# Patient Record
Sex: FEMALE | Race: WHITE
[De-identification: names, ages, dates, MRNs, and addresses within clinical notes are randomized per-mention and may not be internally consistent; named-entity substitution may affect disease eponyms.]

---

## 2017-01-04 NOTE — ERD
ER Documentation


Chief Complaint


Date/Time


DATE: 1/4/17 


TIME: 19:36


Chief Complaint


Pt with B arm burning and numbness X 3 weeks





HPI


Patient complains of 3 weeks of pain in the patient complains of 3 weeks of 

pain mostly in the morning from the bilateral shoulders that radiates down to 

the fingers.  It is a tingling and numbness and hot sensation.  She does 

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  She was told that she has carpal 

tunnel syndrome and they have been given her a wrist brace but actually makes 

her pain worse.  She also cuts her right heel on a piece of glass a few days 

ago now the area is red.





ROS


All systems reviewed and are negative except as per history of present illness.





Medications


Home Meds


Active Scripts


Sulfamethoxazole-Trimethoprim* (Bactrim* DS) 800-160 Mg Tab, 1 TAB PO BID for 5 

Days, TAB


   Prov:ZOHAIB DURBIN DO         1/4/17


Naproxen* (Naprosyn*) 500 Mg Tablet, 500 MG PO BID Y for PAIN AND/OR 

INFLAMMATION, #20 TAB


   Prov:ZOHAIB DURBIN DO         1/4/17


Cyclobenzaprine Hcl* (Cyclobenzaprine Hcl*) 5 Mg Tablet, 5 MG PO Q8H Y for PAIN

, #15 TAB


   Prov:GURMEET KOEHLER PA-C         12/14/16


Prednisone* (Prednisone*) 20 Mg Tab, 40 MG PO DAILY for 4 Days, TAB


   Prov:GURMEET KOEHLER PA-C         12/14/16


Ibuprofen* (Motrin*) 600 Mg Tab, 600 MG PO Q6, #30 TAB


   Prov:GURMEET KOEHLER PA-C         12/14/16





Allergies


Allergies:  


Coded Allergies:  


     No Known Allergy (Unverified , 1/4/17)





PMhx/Soc


Medical and Surgical Hx:  pt denies Medical Hx, pt denies Surgical Hx


History of Surgery:  No


Anesthesia Reaction:  No


Hx Neurological Disorder:  No


Hx Respiratory Disorders:  No


Hx Cardiac Disorders:  No


Hx Psychiatric Problems:  No


Hx Miscellaneous Medical Probl:  No


Hx Alcohol Use:  No


Hx Substance Use:  No


Hx Tobacco Use:  No


Smoking Status:  Never smoker





Physical Exam


Vitals





Vital Signs








  Date Time  Temp Pulse Resp B/P Pulse Ox O2 Delivery O2 Flow Rate FiO2


 


1/4/17 16:12 98.3 85 16 152/75 99   








Physical Exam


Const:  []


Head:   Atraumatic 


Eyes:    Normal Conjunctiva


ENT:    Normal External Ears, Nose and Mouth.


Neck:               Full range of motion..~ No meningismus.


Resp:    Clear to auscultation bilaterally


Cardio:    Regular rate and rhythm, no murmurs


Abd:    Soft, non tender, non distended. Normal bowel sounds


Skin:    No petechiae .  Left heel has a 0.5 cm laceration that is healing with 

surrounding skin is mildly erythematous and slightly warm approximately 3" x 2" 

no fluctuance no discharge


Back:    No midline or flank tenderness


Ext:    No cyanosis, or edema.  Shoulders full range of motion negative Neer's 

negative apprehension test and neurovascularly intact.  Bilateral wrist 

positive Tinel's and positive Phalen's test.  Wrists and hands are 

neurovascularly intact.


Neur:    Awake and alert


Psych:    Normal Mood and Affect





Procedures/MDM


The left heel skin has some erythema so this may be a superficial skin 

infection and will give her Bactrim.  She has positive Tinel's and Phalen's 

bilaterally so she likely has carpal tunnel syndrome and she may have possibly 

nerve impingement from post shoulders as well.  We will give her Naprosyn as 

she is already taking ibuprofen 600 mg and she was given prednisone last time 

she was here as well.  She needs to see Workmen's Compensation from her work 

since this is likely work-related.  I told her that the options for carpal 

tunnel is to rest her hand take frequent breaks if that does not work then 

consider seeing an orthopedist for possible cortisone injections or surgery.  I 

doubt abscess or cellulitis or stroke or TIA or compression syndrome or bony 

fracture.  Vital signs are stable she stable for discharge and outpatient follow

-up.





Departure


Diagnosis:  


 Primary Impression:  


 Bilateral arm pain


 Additional Impressions:  


 Skin infection


 Carpal tunnel syndrome on both sides


Referrals:  


SHAY BABB ANDREW M MD BOGIKIAN,BELINDA OCHOA,MARVA HUDSON,CARMELO GONZALEZ,BETHANY EMERY MD,MD WASSERMAN,THEODORE BETANCOURT MD








[facility]


YOU HAVE RECEIVED A MEDICAL SCREENING EXAM AND THE RESULTS INDICATE THAT YOU DO 

NOT HAVE A CONDITION THAT REQUIRES URGENT TREATMENT IN THE EMERGENCY DEPARTMENT.





FURTHER EVALUATION AND TREATMENT OF YOUR CONDITION CAN WAIT UNTIL YOU ARE SEEN 

IN YOUR DOCTORS OFFICE WITHIN THE NEXT 1-2 DAYS. IT IS YOUR RESPONSIBILITY TO 

MAKE AN APPOINTMENT FOR FOLOW-UP CARE.





IF YOU HAVE A PRIMARY DOCTOR


--you should call your primary doctor and schedule an appointment





IF YOU DO NOT HAVE A PRIMARY DOCTOR YOU CAN CALL OUR PHYSICIAN REFERRAL HOTLINE 

AT


 [phone] 





IF YOU CAN NOT AFFORD TO SEE A PHYSICIAN YOU CAN CHOSE FROM THE FOLLOWING 

[facility] [phone] [address] [facility]





[facility] [phone] [address] [facility]





[facility] [phone] [address] [facility] [phone] [address] [facility] [phone] [address] [facility] [address] [facility]. [facility] [phone] [address] [facility]. [facility]


YOU HAVE RECEIVED A MEDICAL SCREENING EXAM AND THE RESULTS INDICATE THAT YOU DO 

NOT HAVE A CONDITION THAT REQUIRES URGENT TREATMENT IN THE EMERGENCY DEPARTMENT.





FURTHER EVALUATION AND TREATMENT OF YOUR CONDITION CAN WAIT UNTIL YOU ARE SEEN 

IN YOUR DOCTORS OFFICE WITHIN THE NEXT 1-2 DAYS. IT IS YOUR RESPONSIBILITY TO 

MAKE AN APPOINTMENT FOR FOLOW-UP CARE.





IF YOU HAVE A PRIMARY DOCTOR


--you should call your primary doctor and schedule and appointment





IF YOU DO NOT HAVE A PRIMARY DOCTOR YOU CAN CALL OUR PHYSICIAN REFERRAL HOTLINE 

AT [phone].





IF YOU CAN NOT AFFORD TO SEE A PHYSICIAN YOU CAN CHOSE FROM THE FOLLOWING 

[facility] INSTITUTIONS:





[facility]


[address] [place] [address]





[facility]


[address] [place] [address]





[facility]


[address] [place] [address]





[facility]


Urgent Care





7 a.m.- 11 p.m.


Every Day of the Week





NO APPOINTMENT OR AUTHORIZATION NEEDED


[phone]





Additional Instructions:  


follow up with worlman compensation insurance.











ZOHAIB DURBIN DO Jan 4, 2017 19:38

## 2017-07-30 NOTE — ERD
ER Documentation


Chief Complaint


Date/Time


DATE: 7/30/17 


TIME: 22:33


Chief Complaint


s/p fall x1 week c/o right leg/knee pain





HPI


41-year-old female presents here in emergency department for complaints of 

right knee pain after putting pressure on it a week ago. Patient was walking, 

landed on the right leg, put pressure on the right knee, twisted it. Patient 

started to have the pain afterwards throbbing pain, 6/10 scale, is worse upon 

movement accompanied with swelling. Patient denies any numbness or tingling. 

Patient denies any fever or chills. Patient did not take any medications to 

help with symptoms. Patient denies any deformity.





ROS


All systems reviewed and are negative except as per history of present illness.





Medications


Home Meds


Active Scripts


Sulfamethoxazole-Trimethoprim* (Bactrim* DS) 800-160 Mg Tab, 1 TAB PO BID for 5 

Days, TAB


   Prov:ZOHAIB DURBIN DO         1/4/17


Naproxen* (Naprosyn*) 500 Mg Tablet, 500 MG PO BID Y for PAIN AND/OR 

INFLAMMATION, #20 TAB


   Prov:ZOHAIB DURBIN DO         1/4/17


Cyclobenzaprine Hcl* (Cyclobenzaprine Hcl*) 5 Mg Tablet, 5 MG PO Q8H Y for PAIN

, #15 TAB


   Prov:GURMEET KOEHLER PA-C         12/14/16


Prednisone* (Prednisone*) 20 Mg Tab, 40 MG PO DAILY for 4 Days, TAB


   Prov:GURMEET KOEHLER PA-C         12/14/16


Ibuprofen* (Motrin*) 600 Mg Tab, 600 MG PO Q6, #30 TAB


   Prov:GURMEET KOEHLER PA-C         12/14/16





Allergies


Allergies:  


Coded Allergies:  


     No Known Allergy (Unverified , 1/4/17)





PMhx/Soc


Medical and Surgical Hx:  pt denies Medical Hx, pt denies Surgical Hx


History of Surgery:  No


Anesthesia Reaction:  No


Hx Neurological Disorder:  No


Hx Respiratory Disorders:  No


Hx Cardiac Disorders:  No


Hx Psychiatric Problems:  No


Hx Miscellaneous Medical Probl:  No


Hx Alcohol Use:  No


Hx Substance Use:  No


Hx Tobacco Use:  No





FmHx


Family History:  No coronary disease, No diabetes, No other





Physical Exam


Vitals





Vital Signs








  Date Time  Temp Pulse Resp B/P Pulse Ox O2 Delivery O2 Flow Rate FiO2


 


7/30/17 22:05 98.1 75 18 125/70 98   








Physical Exam


GENERAL:  The patient is well developed and appropriate for usual state of 

health, in no apparent distress.


CHEST:  Clear to auscultation bilaterally. There are no rales, wheezes or 

rhonchi. 


HEART:  Regular rate and rhythm. No murmurs, clicks, rubs or gallops. No S3 or 

S4.


ABDOMEN:  Soft, nontender and nondistended. Good bowel sounds. No rebound or 

guarding. No gross peritonitis. No gross organomegaly or masses. No Burgos sign 

or McBurney point tenderness.


BACK:  No midline or flank tenderness.


EXTREMITIES:  Noted swelling in the right knee, mild tenderness on palpation on 

lateral medial aspect of the right knee, some ecchymosis noted. Able to do full 

range of motion but with pain. Equal pulses bilaterally. Full range of motion 

of other joints of the body. Grossly neurovascularly intact.


NEURO:  Alert and oriented. Cranial nerves 2-12 intact. Motor strength in all 4 

extremities with 5/5 strength.  Sensation grossly intact. Normal speech and 

gait. 


SKIN:  There is no apparent rash or petechia. The skin is warm and dry.


HEMATOLOGIC AND LYMPHATIC:  There is no evidence of excessive bruising or 

lymphedema. No gross cervical, axillary, or inguinal lymphadenopathy.


Results 24 hrs





 Current Medications








 Medications


  (Trade)  Dose


 Ordered  Sig/Dacia


 Route


 PRN Reason  Start Time


 Stop Time Status Last Admin


Dose Admin


 


 Ibuprofen


  (Motrin)  600 mg  ONCE  ONCE


 PO


   7/30/17 22:30


 7/30/17 22:31 DC 7/30/17 22:56


 





Patient was given medication for pain here in emergency department, after 

treatment, patient verbalized feeling much better. Patient's pain is improved.





PROCEDURE:    Right knee. 


 


CLINICAL INDICATION:    Pain. 


 


TECHNIQUE:    Three views including AP, lateral and oblique views of the right 

knee were obtained.  The images reviewed  on a PACS workstation. 


 


COMPARISON:   None. 


 


FINDINGS:


There is no fracture, dislocation or bone destruction.  There is no significant 

joint space narrowing or effusion.  Bone mineralization is within normal 

limits.  There is no radiopaque foreign body or abnormal calcification. 


 


IMPRESSION:


No evidence of fracture.


_____________________________________________ 


.Facundo Fortune MD, MD           Date    Time 


Electronically viewed and signed by .Facundo Fortune MD, MD on 07/31/2017 00:37 


 


D:  07/31/2017 00:37  T:  07/31/2017 00:37


.T/





CC: NARCISA MILTON NP





After receiving patients xray report, a  _knee immobilizer was applied on the 

patients _right knee_.  After application of the splint, patient has intact 

sensation and circulation on distal area of the affected joint. Patient does 

not complain of numbness or tingling after application of the splint. Patient 

tolerated procedure well. Crutches was given to use afterwards.





Procedures/MDM


Medical Decision Making: Patient's pain is most likely consistent with a  

contusion or a sprain, possibly ligament injury, further evaluation with 

outpatient MRI was recommended with the patient. There is no suspicion for 

neurovascular compromise. Patient has intact sensation and circulation of the 

affected extremity. There is low  suspicion for septic arthritis. Patient does 

not have any fever. Radiology exams of the affected area does not show any 

fracture or dislocation.





Disposition: Home. Patient is given prescription for ibuprofen for mild to 

moderate pain Norco for severe pain. Patient was advised to elevate the 

affected area and apply ice on  affected area. Patient was advised that if 

symptoms are worse, numbness, tingling, high fever, unable to move joint, 

worsening symptoms, to return to emergency department immediately. Otherwise, 

patient is advised to follow up with the primary care doctor in 5-7 days for 

reevaluation of symptoms. Patient was advised to see primary care doctor and 

request for an MRI outpatient.





Departure


Diagnosis:  


 Primary Impression:  


 Knee pain


 Laterality:  right  Chronicity:  acute  Qualified Code:  M25.561 - Acute pain 

of right knee


Condition:  Stable


Patient Instructions:  Knee Pain, Uncertain Cause





Additional Instructions:  


 Patient is given prescription for ibuprofen for mild to moderate pain Norco 

for severe pain. Patient was advised to elevate the affected area and apply ice 

on  affected area. Patient was advised that if symptoms are worse, numbness, 

tingling, high fever, unable to move joint, worsening symptoms, to return to 

emergency department immediately. Otherwise, patient is advised to follow up 

with the primary care doctor in 5-7 days for reevaluation of symptoms. Patient 

was advised to see primary care doctor and request for an MRI outpatient.











NARCISA MILTON NP Jul 30, 2017 22:36

## 2017-07-31 NOTE — RADRPT
PROCEDURE:    Right knee. 

 

CLINICAL INDICATION:    Pain. 

 

TECHNIQUE:    Three views including AP, lateral and oblique views of the right knee were obtained.  
The images reviewed  on a PACS workstation. 

 

COMPARISON:   None. 

 

FINDINGS:

There is no fracture, dislocation or bone destruction.  There is no significant joint space narrowin
g or effusion.  Bone mineralization is within normal limits.  There is no radiopaque foreign body or
 abnormal calcification. 

 

IMPRESSION:

No evidence of fracture.

_____________________________________________ 

.Facundo Fortune MD, MD           Date    Time 

Electronically viewed and signed by .Facundo Fortune MD, MD on 07/31/2017 00:37 

 

D:  07/31/2017 00:37  T:  07/31/2017 00:37

.T/

## 2017-08-20 NOTE — RADRPT
PROCEDURE:   XR RIGHT KNEE. 

 

CLINICAL INDICATION:   Right knee recurrent injury.  Pain. 

 

TECHNIQUE:   Three views of the right knee are available for review. 

 

COMPARISON: 07/30/2017. 

 

FINDINGS:

 

There is a moderate-to-large joint effusion present and there has been an increase in size.  No frac
ture is identified.  The findings may reflect a ligamentous injury or meniscal tear.  There is mild 
arthrosis of the patellofemoral joint.  No evidence for bone destructive or erosive change.

 

IMPRESSION:

 

 

1.  Increase in size of joint effusion.

2.  No fractures seen however the effusion could reflect ligamentous or meniscal injury.

 

RPTAT: XX

_____________________________________________ 

.Laurent Kent MD, MD           Date    Time 

Electronically viewed and signed by .Laurent Kent MD, MD on 08/20/2017 18:23 

 

D:  08/20/2017 18:23  T:  08/20/2017 18:23

.T/

## 2017-08-20 NOTE — ERD
ER Documentation


Chief Complaint


Date/Time


DATE: 17 


TIME: 16:58


Chief Complaint


Complains of right knee and left leg pain x 2 weeks





HPI


41-year-old female presents emergency department for right knee pain.  Stated 

that she has injured this morning month ago while she was walking the beach, 

fell, landed on her right knee.  On that same date, she went to the emergency 

department and have her knee imaging which resulted to be negative, on crutches 

and follow-up with her primary care physician, and was told to come back in the 

emergency department if she feels worse.  Now she states that she has been 

walking without crutches and not sure if she has reinjured the same knee.  She 

also added that she has lower back pain that is nontraumatic, pain radiates to 

her left lower extremity.





Denies headache, loss of consciousness, dizziness, blurry vision, changes in 

vision, photophobia, facial pain, ear pain, throat pain, difficulty swallowing, 

neck pain, shoulder pain, chest pain, cough, hemoptysis, abdominal pain, back 

pain, loss of appetite, nausea, vomiting, hematochezia, diarrhea, constipation, 

urinary symptoms, pregnancy, the possibility of being pregnant, bladder and 

bowel incontinences, extremity weakness, numbness or tingling sensation, 

difficulty walking, recent travel, recent exposure to illness, recent 

antibiotic use in the last 3 months, fever, chills. 





Allergy: No known drug allergies.


PMH: Anxiety, carpal tunnel syndrome, asthma.


Family medical history: Denies.  


 A0.


LMP: "2 months ago."


Medications: Norco.


Surgery:  4.


Primary Social History: Denies.


Denies smoking, use of alcohol, use of illegal drugs.





ROS


All systems reviewed and are negative except as per history of present illness.





Medications


Home Meds


Active Scripts


Tramadol HCl (Tramadol HCl) 50 Mg Tablet, 50 MG PO Q6, #20 TAB


   Prov:DOUGLAS JONES         17


Albuterol Sulfate* (Proair HFA*) 8.5 Gm Hfa.aer.ad, 2 PUFF INH Q4, #1 INHALER


   Prov:DOUGLAS JONES         17


Ibuprofen* (Motrin*) 800 Mg Tab, 800 MG PO Q8 Y for PAIN AND OR ELEVATED TEMP, #

30 TAB


   Prov:DOUGLAS JONES         17


Cyclobenzaprine Hcl* (Cyclobenzaprine Hcl*) 10 Mg Tablet, 10 MG PO Q12, #20 TAB


   Prov:DOUGLAS JONES F         17


Hydrocodone/Acetaminophen (Norco 5-325 Tablet) 1 Each Tablet, 1 TAB PO Q6H Y 

for SEVERE PAIN LEVEL 7-10, #20 TAB


   Prov:NARCISA MILTON NP         17


Ibuprofen* (Motrin*) 600 Mg Tab, 600 MG PO Q6H Y for PAIN AND OR ELEVATED TEMP, 

#30 TAB


   Prov:NARCISA MILTON NP         17


Sulfamethoxazole-Trimethoprim* (Bactrim* DS) 800-160 Mg Tab, 1 TAB PO BID for 5 

Days, TAB


   Prov:GIFTY,ZOHAIB DO         17


Naproxen* (Naprosyn*) 500 Mg Tablet, 500 MG PO BID Y for PAIN AND/OR 

INFLAMMATION, #20 TAB


   Prov:ZOHAIB DURBIN DO         17


Cyclobenzaprine Hcl* (Cyclobenzaprine Hcl*) 5 Mg Tablet, 5 MG PO Q8H Y for PAIN

, #15 TAB


   Prov:GURMEET KOEHLER PA-C         16


Prednisone* (Prednisone*) 20 Mg Tab, 40 MG PO DAILY for 4 Days, TAB


   Prov:GURMEET KOEHLER PA-C         16


Ibuprofen* (Motrin*) 600 Mg Tab, 600 MG PO Q6, #30 TAB


   Prov:GURMEET KOEHLER PA-C         16





Allergies


Allergies:  


Coded Allergies:  


     No Known Allergy (Unverified , 17)





PMhx/Soc


History of Surgery:  Yes (c-sec, maikol)


Anesthesia Reaction:  No


Hx Neurological Disorder:  No


Hx Respiratory Disorders:  No


Hx Cardiac Disorders:  No


Hx Psychiatric Problems:  No


Hx Miscellaneous Medical Probl:  Yes (carpal tunnel)


Hx Alcohol Use:  No


Hx Substance Use:  No


Hx Tobacco Use:  No





Physical Exam


Vitals





Vital Signs








  Date Time  Temp Pulse Resp B/P Pulse Ox O2 Delivery O2 Flow Rate FiO2


 


17 16:39 98.5 86 20 132/64 100   








Physical Exam


CONSTITUTIONAL: Well-appearing; well-nourished; in no apparent distress.  


HEAD: Normocephalic; atraumatic.  


EYES: Conjunctiva clear, sclera non-icteric, EOM intact. PERRL 


Ears: Hearing intact. EACs clear, TMs non-bulging, non-inflamed, translucent & 

mobile, ossicles normal appearance, No obstructions, no erythema, no discharges


Nose: No obstructions. No polyps. No external lesions. Mucosa non-inflamed. No 

external lesions, septum and turbinates normal. No rhinorrhea. No discharges. 

Frontal sinus is non-tender to palpation. Maxillary sinus is non-tender to 

palpation. 


MOUTH: Moist mucous membranes, no lesion, no obstructions, no vesicles, no 

thrush, patent airway


Throat: Uvula in midline. Right tonsil is +1 with no erythema, no exudate. Left 

tonsil is +1 with no erythema, no exudate. Tolerating secretions well. Good gag 

reflex. Patent airway.


Neck: Supple, without lesions, bruits, or adenopathy. No mass. Thyroid non-

enlarged and non-tender to palpation.


CHEST: Symmetrical chest. Respirations even and not labored. No retractions 

noted.


CARDIOVASCULAR: Normal S1, S2. RRR. No murmurs, gallops.


RESPIRATORY: Normal chest excursion with respiration; breath sounds clear and 

equal bilaterally; no wheezes, rhonchi, or rales.  Breathing even and 

unlabored. Speaking in clear, full, and complete sentences w/ ease. 


ABDOMEN: Normal bowel sounds normal. Soft, round, non-distended, non-guarding, 

no tenderness, no rebound, no organomegaly, no masses, no pulsating abdominal 

mass. No hernia. No peritoneal signs.


: No CVA tenderness. 


BACK: Symmetrical shoulder. Spine is midline without deformity, tenderness. No 

evidence of trauma or deformity.


PELVIS: Stable pelvis. No evidence of trauma or deformity.  


MUSCULOSKELETAL: Normal gait and station. No misalignment, asymmetry, 

crepitation, defects, tenderness, masses, effusions, decreased range of motion, 

instability, atrophy or abnormal strength or tone in the head, neck, spine, ribs

, pelvis or extremities. No calf tenderness.  Stable and unremarkable bilateral 

hips.  Left straight leg test is positive.  Left knee is unremarkable.  Left 

ankle/foot are unremarkable with no neurovascular deficits.  Right knee has no 

obvious deformity but tenderness to palpation to anterior and posterior part 

with good and full range of motion.  Negative Lachman test.  Negative drawer 

test.  Right ankle/foot are unremarkable with no neurovascular deficits.  

Negative Homans sign bilaterally.


NEUROVASCULAR: Distal pulses are present. Pedal pulse are present, equal, and 

normal. Capillary refills are < 2 seconds.


NEUROLOGIC: Alert and oriented x4. Speaks full and clear sentences.Sensation to 

pain, touch, and proprioception normal. Grossly unremarkable. No neurologic 

deficits. 


PSYCHOLOGICAL: The patients mood and manner are appropriate. No hallucinations

, delusions. Not SI. Not HI. Has the capacity to decide for self


SKIN: Normal for age and ethnicity; warm; dry; good turgor; no apparent lesions 

or exudates. No rashes, hives, discoloration. Intact.


Results 24 hrs





 Current Medications








 Medications


  (Trade)  Dose


 Ordered  Sig/Dacia


 Route


 PRN Reason  Start Time


 Stop Time Status Last Admin


Dose Admin


 


 Acetaminophen/


 Hydrocodone Bitart


  (Norco (10/325))  1 tab  ONCE  ONCE


 PO


   17 18:00


 17 18:01 DC 17 17:55


 











Procedures/MDM


Examination: Please see physical examination.





Disease process, medical treatment was explained to the patient and family 

member. They verbalized understanding and agreed with the diagnostic tests, 

medical treatment, and follow-up care.





Radiology:


X-ray of the right knee


Impression: Increasing size of joint effusion.  No fracture seen however the 

effusion could reflect ligamentous or meniscal injury.





Treatment: Knee immobilizer.  Crutches.





Re-evaluation: Denies headache, dizziness, blurry vision, neck pain, shoulder 

pain, chest pain, back pain, abdominal pain, nausea, vomiting.  No episode of 

emesis in the emergency department. Alert and oriented 4.  Speaks full and 

clear sentences.  Respirations even and unlabored.  Lung sounds clear to 

auscultation.  Ambulatory with steady gait.  No neurovascular deficits.  No 

neurological deficits.  No neurovascular deficits prior to and after the 

application of knee immobilizer.





Consultation: None.





Differential diagnosis: Fracture versus dislocation versus displacement versus 

contusion versus sprain versus sciatica





Medical decision m[age]-year-old female presents emergency department for 

right knee pain.  Stated that she has injured this morning month ago while she 

was walking the beach, fell, landed on her right knee.  On that same date, she 

went to the emergency department and have her knee imaging which resulted to be 

negative, on crutches and follow-up with her primary care physician, and was 

told to come back in the emergency department if she feels worse.  Now she 

states that she has been walking without crutches and not sure if she has 

reinjured the same knee.  She also added that she has lower back pain that is 

nontraumatic, pain radiates to her left lower extremity.  Patient's complaint, 

patient's history about her complaint, my physical findings, diagnostic test 

results, my reevaluation after treatment are consistent my final diagnosis of 

low back strain, sciatica, sneeze sprain, knee contusion.  





Patient was also asking for an inhaler for her chronic asthma stating that she 

ran out of this inhaler.





Medications prescribed are the following: Flexeril.  Motrin.  Tramadol.  Pro-

air.





Patient and family member are made aware of the side effects and adverse 

reactions of the medications prescribed. Instructed on when to seek emergent 

and medical attention in case allergic/anaphylactic reactions or severe side 

effects and or adverse reactions to medications. Patient and family member 

verbalized understanding. 





Patient instructed


Instructed to follow-up with his PCP in 24-48 hours.  PCP to refer patient to 

orthopedic doctor in the next 24-48 hours.


Instructed to Call 911 for chest pain, shortness of breath. Advised to come 

back here in ED as soon as possible for severity of symptoms which includes but 

not limited to: any new symptoms; shortness of breath/difficulty of breathing; 

cardiovascular changes; severe gastrointestinal symptoms; signs and symptoms of 

bleeding and or infection; signs of compartment syndrome/neurovascular changes; 

neurological changes/deficits. 


Patient and family member verbalized understanding. 





Upon discharge, patient is alert and oriented x 4, speaks full and clear 

sentences, denies pain, has no neurological deficits, has no neurovascular 

deficits, difficulty of breathing. Breathing even and unlabored. Lung sounds 

are clear to auscultation. Not in distress. Appears comfortable.  Ambulatory 

with steady gait. Appears satisfied with care provided here in ED.





Departure


Diagnosis:  


 Primary Impression:  


 Low back strain


 Additional Impressions:  


 Sciatica


 Knee sprain


 Knee contusion


 Meniscal injury


Condition:  Stable





Additional Instructions:  


Instructed to follow-up with his PCP in 24-48 hours.  PCP to refer patient to 

orthopedic doctor in the next 24-48 hours.


Instructed to Call 911 for chest pain, shortness of breath. Advised to come 

back here in ED as soon as possible for severity of symptoms which includes but 

not limited to: any new symptoms; shortness of breath/difficulty of breathing; 

cardiovascular changes; severe gastrointestinal symptoms; signs and symptoms of 

bleeding and or infection; signs of compartment syndrome/neurovascular changes; 

neurological changes/deficits. 


Patient and family member verbalized understanding.











DOUGLAS JONES Aug 20, 2017 17:09

## 2017-09-07 NOTE — ERD
ER Documentation


Chief Complaint


Date/Time


DATE: 9/7/17 


TIME: 14:45


Chief Complaint


SCIATICA PAIN,RIGHT KNEE PAIN FROM A FALL INCIDENT 2 MOS AGO





HPI


41-year-old female with history of sciatica, chronic right knee pain, bilateral 

carpal tunnel syndrome presents with multiple body complaints and pains.  She 

comes here in pain because she was evicted from her home, and she states that 

she lost her medications.  Patient reports she usually takes tramadol, 

ibuprofen for pain.  She has a appointment to see a primary doctor on September 

15 which is a week from now. Patient reports that she has had pain rated severe

, and reports associated nausea vomiting that started this morning.  She denies 

abdominal pain.  She denies UTI symptoms. Patient denies saddle anesthesia loss 

of bowel bladder function.  She denies fevers or chills or trauma.





ROS


All systems reviewed and are negative except as per history of present illness.





Medications


Home Meds


Active Scripts


Tramadol HCl (Tramadol HCl) 50 Mg Tablet, 50 MG PO Q4 Y for PAIN, #15 TAB


   Prov:GURMEET KOEHLER PA-C         9/7/17


Ondansetron (Ondansetron Odt) 4 Mg Tab.rapdis, 4 MG PO Q6H Y for NAUSEA AND/OR 

VOMITING, #10 TAB


   Prov:GURMEET KOEHLER PA-C         9/7/17


Tramadol HCl (Tramadol HCl) 50 Mg Tablet, 50 MG PO Q6, #20 TAB


   Prov:DOUGLAS JONES         8/20/17


Albuterol Sulfate* (Proair HFA*) 8.5 Gm Hfa.aer.ad, 2 PUFF INH Q4, #1 INHALER


   Prov:DOUGLAS JONES         8/20/17


Ibuprofen* (Motrin*) 800 Mg Tab, 800 MG PO Q8 Y for PAIN AND OR ELEVATED TEMP, #

30 TAB


   Prov:DOUGLAS JONES         8/20/17


Cyclobenzaprine Hcl* (Cyclobenzaprine Hcl*) 10 Mg Tablet, 10 MG PO Q12, #20 TAB


   Prov:DOUGLAS JONES         8/20/17


Hydrocodone/Acetaminophen (Norco 5-325 Tablet) 1 Each Tablet, 1 TAB PO Q6H Y 

for SEVERE PAIN LEVEL 7-10, #20 TAB


   Prov:NARCISA MILTON NP         7/31/17


Ibuprofen* (Motrin*) 600 Mg Tab, 600 MG PO Q6H Y for PAIN AND OR ELEVATED TEMP, 

#30 TAB


   Prov:NARCISA MILTON NP         7/31/17


Sulfamethoxazole-Trimethoprim* (Bactrim* DS) 800-160 Mg Tab, 1 TAB PO BID for 5 

Days, TAB


   Prov:ZOHAIB DURBIN DO         1/4/17


Naproxen* (Naprosyn*) 500 Mg Tablet, 500 MG PO BID Y for PAIN AND/OR 

INFLAMMATION, #20 TAB


   Prov:ZOHAIB DURBIN DO         1/4/17


Cyclobenzaprine Hcl* (Cyclobenzaprine Hcl*) 5 Mg Tablet, 5 MG PO Q8H Y for PAIN

, #15 TAB


   Prov:GURMEET KOEHLER PA-C         12/14/16


Prednisone* (Prednisone*) 20 Mg Tab, 40 MG PO DAILY for 4 Days, TAB


   Prov:GURMEET KOEHLER PA-C         12/14/16


Ibuprofen* (Motrin*) 600 Mg Tab, 600 MG PO Q6, #30 TAB


   Prov:GURMEET KOEHLER PA-C         12/14/16





Allergies


Allergies:  


Coded Allergies:  


     No Known Allergy (Unverified , 8/20/17)





PMhx/Soc


History of Surgery:  Yes (c-sec, maikol)


Anesthesia Reaction:  No


Hx Neurological Disorder:  No


Hx Respiratory Disorders:  No


Hx Cardiac Disorders:  No


Hx Psychiatric Problems:  No


Hx Miscellaneous Medical Probl:  Yes (carpal tunnel)


Hx Alcohol Use:  No


Hx Substance Use:  No


Hx Tobacco Use:  No


Smoking Status:  Never smoker





Physical Exam


Vitals





Vital Signs








  Date Time  Temp Pulse Resp B/P Pulse Ox O2 Delivery O2 Flow Rate FiO2


 


9/7/17 15:05  72 16 121/56 98 Room Air  


 


9/7/17 11:52 97.9 82 18 188/89 100   








Physical Exam


General: Well-developed, well-nourished.  The patient appears in no acute 

distress.


HEENT: Head is normocephalic, atraumatic. No scleral icterus.  Neck: Supple.  

Nontender.


Lungs: Clear to auscultation.  Normal air movement.


Heart: Regular rate and rhythm.  S1 and S2 are normal.  No murmurs, gallops, or 

rubs.


Abdomen: Soft, nontender, nondistended.  Bowel sounds are normoactive.


Back: No midline tenderness, no step-offs.  Strength lower extremities 5 out of 

5 bilaterally.


Extremities: No clubbing or cyanosis.  Normal pulses. Moving extremities x 4. 

No weakness.


Neurologic: Alert and oriented 3.  No focal deficits.


Skin: Normal turgor.  No rash or lesions.


Results 24 hrs





 Laboratory Tests








Test


  9/7/17


13:38


 


Beta HCG, Quantitative < 2.4mIU/ml 








 Current Medications








 Medications


  (Trade)  Dose


 Ordered  Sig/Dacia


 Route


 PRN Reason  Start Time


 Stop Time Status Last Admin


Dose Admin


 


 Ketorolac


 Tromethamine


  (Toradol)  30 mg  ONCE  STAT


 IM


   9/7/17 13:21


 9/7/17 13:33 DC  


 


 


 Ondansetron HCl


  (Zofran Odt)  4 mg  ONCE  STAT


 ODT


   9/7/17 13:22


 9/7/17 13:23 DC 9/7/17 13:22


 


 


 Ketorolac


 Tromethamine


  (Toradol)  30 mg  ONCE  STAT


 IV


   9/7/17 13:32


 9/7/17 13:33 DC 9/7/17 13:32


 











Procedures/MDM


ED course:


We will try to obtain urine from her, patient states that she was not able to 

urinate she reports vomiting, IV line was established, blood was drawn and her 

beta quantitative is negative so she was given Toradol 30 mg IV.  She received 

Zofran 4 mg ODT.





Reassessment the patient shows that her pain is much improved, blood pressure 

was rechecked and has normalized.





MDM: 41-year-old female comes in with multiple complaints with body pain, back 

pain, bilateral carpal tunnel pain as well as knee pain.Patient presents with 

acute onset of pain, she was given Toradol and Zofran which alleviated her 

symptoms.  I do not see any signs of acute coronary syndrome, dissection, 

aneurysm, pulmonary embolus.  Back pain appears to be chronic.  I advised that 

we do not refill narcotic pain medication are lost or stolen emergency room.  

Given a short course of tramadol, as well as Zofran for symptoms.  Further pain 

management she is to follow-up with her primary doctor.





Departure


Diagnosis:  


 Primary Impression:  


 Sciatica


 Additional Impression:  


 Knee sprain


Condition:  Good


Patient Instructions:  Back Pain W/ Sciatica





Additional Instructions:  


Call your primary care doctor TOMORROW for an appointment during the next 1-2 

days.See the doctor sooner or return here if your condition worsens before your 

appointment time.











GURMEET KOEHLER PA-C Sep 7, 2017 14:48

## 2017-10-16 ENCOUNTER — HOSPITAL ENCOUNTER (EMERGENCY)
Dept: HOSPITAL 10 - FTE | Age: 42
Discharge: HOME | End: 2017-10-16
Payer: COMMERCIAL

## 2017-10-16 VITALS
HEIGHT: 62 IN | BODY MASS INDEX: 52.74 KG/M2 | WEIGHT: 286.6 LBS | WEIGHT: 286.6 LBS | HEIGHT: 62 IN | BODY MASS INDEX: 52.74 KG/M2

## 2017-10-16 DIAGNOSIS — S61.216A: Primary | ICD-10-CM
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
## 2017-10-16 PROCEDURE — 12001 RPR S/N/AX/GEN/TRNK 2.5CM/<: CPT

## 2017-10-16 PROCEDURE — 99282 EMERGENCY DEPT VISIT SF MDM: CPT

## 2017-10-16 NOTE — ERD
ER Documentation


Chief Complaint


Date/Time


DATE: 10/16/17 


TIME: 18:46


Chief Complaint


Laceration





HPI


41-year-old female presents emergency department status post slip and fall in 

the shower, complaining of a laceration to her right fifth digit.  She states 

while she was fine there is a piece of metal sticking out and it caused a 

laceration to the lateral aspect of her pinky finger.  No difficulty with 

movement of the finger, she denies paresthesias.





ROS


All systems reviewed and are negative except as per history of present illness.





Medications


Home Meds


Active Scripts


Tramadol HCl (Tramadol HCl) 50 Mg Tablet, 50 MG PO Q4 Y for PAIN, #15 TAB


   Prov:GURMEET KOEHLER PA-C         9/7/17


Ondansetron (Ondansetron Odt) 4 Mg Tab.rapdis, 4 MG PO Q6H Y for NAUSEA AND/OR 

VOMITING, #10 TAB


   Prov:GURMEET KOEHLER PA-C         9/7/17


Tramadol HCl (Tramadol HCl) 50 Mg Tablet, 50 MG PO Q6, #20 TAB


   Prov:DOUGLAS JONES         8/20/17


Albuterol Sulfate* (Proair HFA*) 8.5 Gm Hfa.aer.ad, 2 PUFF INH Q4, #1 INHALER


   Prov:DOUGLAS JONES         8/20/17


Ibuprofen* (Motrin*) 800 Mg Tab, 800 MG PO Q8 Y for PAIN AND OR ELEVATED TEMP, #

30 TAB


   Prov:DOUGLAS JONES         8/20/17


Cyclobenzaprine Hcl* (Cyclobenzaprine Hcl*) 10 Mg Tablet, 10 MG PO Q12, #20 TAB


   Prov:DOUGLAS JONES         8/20/17


Hydrocodone/Acetaminophen (Norco 5-325 Tablet) 1 Each Tablet, 1 TAB PO Q6H Y 

for SEVERE PAIN LEVEL 7-10, #20 TAB


   Prov:NARCISA MILTON NP         7/31/17


Ibuprofen* (Motrin*) 600 Mg Tab, 600 MG PO Q6H Y for PAIN AND OR ELEVATED TEMP, 

#30 TAB


   Prov:NARCISA MILTON NP         7/31/17


Sulfamethoxazole-Trimethoprim* (Bactrim* DS) 800-160 Mg Tab, 1 TAB PO BID for 5 

Days, TAB


   Prov:ZOHAIB DURBIN DO         1/4/17


Naproxen* (Naprosyn*) 500 Mg Tablet, 500 MG PO BID Y for PAIN AND/OR 

INFLAMMATION, #20 TAB


   Prov:ZOHAIB DURBIN DO         1/4/17


Cyclobenzaprine Hcl* (Cyclobenzaprine Hcl*) 5 Mg Tablet, 5 MG PO Q8H Y for PAIN

, #15 TAB


   Prov:GURMEET KOEHLER PA-C         12/14/16


Prednisone* (Prednisone*) 20 Mg Tab, 40 MG PO DAILY for 4 Days, TAB


   Prov:GURMEET KOEHLER PA-C         12/14/16


Ibuprofen* (Motrin*) 600 Mg Tab, 600 MG PO Q6, #30 TAB


   Prov:GURMEET KOEHLER PA-C         12/14/16





Allergies


Allergies:  


Coded Allergies:  


     No Known Allergy (Unverified , 10/16/17)





PMhx/Soc


History of Surgery:  Yes (c-sec, maikol)


Anesthesia Reaction:  No


Hx Neurological Disorder:  No


Hx Respiratory Disorders:  No


Hx Cardiac Disorders:  No


Hx Psychiatric Problems:  No


Hx Miscellaneous Medical Probl:  Yes (carpal tunnel)


Hx Alcohol Use:  No


Hx Substance Use:  No


Hx Tobacco Use:  No


Smoking Status:  Never smoker





Physical Exam


Vitals





Vital Signs








  Date Time  Temp Pulse Resp B/P Pulse Ox O2 Delivery O2 Flow Rate FiO2


 


10/16/17 15:39 98.1 99 18 129/63 99   








Physical Exam


= General: Well-developed, well-nourished.  The patient appears in no acute 

distress.


HEENT: Head is normocephalic, atraumatic.  No scleral icterus.


Neck: Supple.  Nontender.


Lungs: Clear to auscultation.  Normal air movement.


Heart: Regular rate and rhythm.  S1 and S2 are normal.  No murmurs, gallops, or 

rubs.


Abdomen: Nondistended.


Extremities: No clubbing or cyanosis. Moving extremities x 4. No weakness.


Neurologic: Alert and oriented 3.  No focal deficits. Normal speech and gait.


Skin: 1 cm V-shaped laceration at the lateral aspect of the right fifth digit.  

There is no active bleeding, it is superficial, and is avulsion of the skin.  

For torsion at DIP, PIP and MCP joint.


Results 24 hrs





 Current Medications








 Medications


  (Trade)  Dose


 Ordered  Sig/Dacia


 Route


 PRN Reason  Start Time


 Stop Time Status Last Admin


Dose Admin


 


 Lidocaine


  (Xylocaine 1%


  (Mdv) 20 ml)  20 ml  ONCE  ONCE


 SC


   10/16/17 18:00


 10/16/17 18:01 DC  


 











Procedures/MDM


Laceration Repair by me: She was verbally consented


Anesthesia:                             1% lidocaine locally


Location:                                  Right fifth digit


Tendon/Joint/Nerves:             No injury


Foreign body:                           None detected after copious irrigation 

and exploration


Technique:                              Simple Interrupted Sutures 5 using 5-0 

Ethilon


Complexity:                             No subcutaneous sutures/mucosal repair/

edge excision


Post Closure Length:             2cm





Patient's bleeding was easily controlled in the department and there is no 

indication of anemia.


No evidence of compartment syndrome, neurologic injury, vascular injury, open 

joint, tendon laceration, or foreign body.


Patient is appropriate for outpatient follow up.





48 hour wound check.  Scar minimization instructions given.





Departure


Diagnosis:  


 Primary Impression:  


 Laceration


Condition:  Good


Patient Instructions:  Laceration, All





Additional Instructions:  


Follow up in 2 days in your clinic for wound check.





Follow up with your physician to remove the stitches: 7-10 days.











GURMEET KOEHLER PA-C Oct 16, 2017 18:48

## 2017-10-22 ENCOUNTER — HOSPITAL ENCOUNTER (EMERGENCY)
Dept: HOSPITAL 10 - FTE | Age: 42
Discharge: LEFT BEFORE DISCHARGE | End: 2017-10-22
Payer: COMMERCIAL

## 2017-10-22 VITALS
BODY MASS INDEX: 45.99 KG/M2 | BODY MASS INDEX: 45.99 KG/M2 | HEIGHT: 67 IN | WEIGHT: 293 LBS | WEIGHT: 293 LBS | HEIGHT: 67 IN

## 2017-10-22 DIAGNOSIS — Z53.21: Primary | ICD-10-CM

## 2017-10-23 ENCOUNTER — HOSPITAL ENCOUNTER (EMERGENCY)
Dept: HOSPITAL 10 - FTE | Age: 42
Discharge: LEFT BEFORE DISCHARGE | End: 2017-10-23
Payer: COMMERCIAL

## 2017-10-23 VITALS
BODY MASS INDEX: 45.67 KG/M2 | HEIGHT: 67 IN | WEIGHT: 291.01 LBS | BODY MASS INDEX: 45.67 KG/M2 | WEIGHT: 291.01 LBS | HEIGHT: 67 IN

## 2017-10-23 DIAGNOSIS — Z48.02: Primary | ICD-10-CM

## 2017-10-23 PROCEDURE — 99281 EMR DPT VST MAYX REQ PHY/QHP: CPT

## 2017-10-23 NOTE — ERD
ER Documentation


Chief Complaint


Chief Complaint


suture removal right 5th finger





HPI


41-year-old female presented ED for suture removal of her right fifth finger.  

She has sustained laceration 7 days ago, and had closure done here.  He reports 

no problems, able to flex and extend her affected finger.  Denies fever or 

chills.  Denies increased pain, swelling, or exudate.





ROS


All systems reviewed and are negative except as per history of present illness.





Medications


Home Meds


Active Scripts


Tramadol HCl (Tramadol HCl) 50 Mg Tablet, 50 MG PO Q4 Y for PAIN, #15 TAB


   Prov:GURMEET KOEHLER PA-C         9/7/17


Ondansetron (Ondansetron Odt) 4 Mg Tab.rapdis, 4 MG PO Q6H Y for NAUSEA AND/OR 

VOMITING, #10 TAB


   Prov:GURMEET KOEHLER PA-C         9/7/17


Tramadol HCl (Tramadol HCl) 50 Mg Tablet, 50 MG PO Q6, #20 TAB


   Prov:DOUGLAS JONES         8/20/17


Albuterol Sulfate* (Proair HFA*) 8.5 Gm Hfa.aer.ad, 2 PUFF INH Q4, #1 INHALER


   Prov:DOUGLAS JONES         8/20/17


Ibuprofen* (Motrin*) 800 Mg Tab, 800 MG PO Q8 Y for PAIN AND OR ELEVATED TEMP, #

30 TAB


   Prov:DOUGLAS JONES         8/20/17


Cyclobenzaprine Hcl* (Cyclobenzaprine Hcl*) 10 Mg Tablet, 10 MG PO Q12, #20 TAB


   Prov:DOUGLAS JONES         8/20/17


Hydrocodone/Acetaminophen (Norco 5-325 Tablet) 1 Each Tablet, 1 TAB PO Q6H Y 

for SEVERE PAIN LEVEL 7-10, #20 TAB


   Prov:NARCISA MILTON NP         7/31/17


Ibuprofen* (Motrin*) 600 Mg Tab, 600 MG PO Q6H Y for PAIN AND OR ELEVATED TEMP, 

#30 TAB


   Prov:NARCISA MILTON NP         7/31/17


Sulfamethoxazole-Trimethoprim* (Bactrim* DS) 800-160 Mg Tab, 1 TAB PO BID for 5 

Days, TAB


   Prov:ZOHAIB DURBIN DO         1/4/17


Naproxen* (Naprosyn*) 500 Mg Tablet, 500 MG PO BID Y for PAIN AND/OR 

INFLAMMATION, #20 TAB


   Prov:ZOHAIB DURBIN DO         1/4/17


Cyclobenzaprine Hcl* (Cyclobenzaprine Hcl*) 5 Mg Tablet, 5 MG PO Q8H Y for PAIN

, #15 TAB


   Prov:GURMEET KOEHLER PA-C         12/14/16


Prednisone* (Prednisone*) 20 Mg Tab, 40 MG PO DAILY for 4 Days, TAB


   Prov:GURMEET KOEHLER PA-C         12/14/16


Ibuprofen* (Motrin*) 600 Mg Tab, 600 MG PO Q6, #30 TAB


   Prov:GURMEET KOEHLER PA-C         12/14/16





Allergies


Allergies:  


Coded Allergies:  


     No Known Allergy (Unverified , 10/22/17)





PMhx/Soc


History of Surgery:  Yes (c-sec, maikol)


Anesthesia Reaction:  No


Hx Neurological Disorder:  No


Hx Respiratory Disorders:  No


Hx Cardiac Disorders:  No


Hx Psychiatric Problems:  No


Hx Miscellaneous Medical Probl:  Yes (carpal tunnel)


Hx Alcohol Use:  No


Hx Substance Use:  No


Hx Tobacco Use:  No





Physical Exam


Vitals





Vital Signs








  Date Time  Temp Pulse Resp B/P Pulse Ox O2 Delivery O2 Flow Rate FiO2


 


10/23/17 09:59 98.1 90 18 133/65 100   








Physical Exam


General:    Well-developed, well-nourished, conscious and coherent, in no 

distress


Skin:    Warm and dry without rash, good texture and turgor


Head:    Normocephalic without evidence of trauma


Eyes:    Sclera and conjunctivae normal; pupils equal, round, and reactive to 

light; extraocular movements are intact


Chest:    Normal AP diameter.  Good expansion without retractions.  Nontender.  

Lungs are clear to auscultate bilaterally with good tidal volume


Heart:    Regular rate and rhythm.  No murmur, rub, or gallops heard


Abdomen:    Soft and nontender without masses, guarding, or rebound.  Bowel 

sounds are active.  No hepatosplenomegaly


Extremities:    Full range of motion.  Good strength bilaterally.  No clubbing, 

cyanosis, or edema. Peripheral pulses are intact. Sensation intact.  No 

periorbital erythema, swelling, or exudate at the suture site


Neuro:    Alert and oriented 4, GCS 15.  Cranial nerves grossly intact.  Motor 

and sensory exams nonfocal.  Moves all extremities.  Speech clear.  Gait normal





Procedures/MDM


Suture Removal by me:


Sutures removed with tweezers and scissors without incident.





Wound shows no evidence of infection, foreign body, neurologic injury, vascular 

injury, open joint or tendon laceration.  No wound dehiscence.





Patient to follow up PRN.





Departure


Diagnosis:  


 Primary Impression:  


 Encounter for removal of sutures


Condition:  Stable


Patient Instructions:  Suture Removal, No Complication


Referrals:  


[facility] (PCP)





Additional Instructions:  


Call your primary care doctor TOMORROW for an appointment during the next 1 

WEEK.Tell the  that you were referred from this facility.See the 

doctor sooner or return here if your  condition worsens before your appointment 

time.











SUZANNE RAYA NP Oct 23, 2017 11:06

## 2017-12-14 ENCOUNTER — HOSPITAL ENCOUNTER (EMERGENCY)
Dept: HOSPITAL 10 - FTE | Age: 42
Discharge: HOME | End: 2017-12-14
Payer: COMMERCIAL

## 2017-12-14 VITALS
WEIGHT: 293 LBS | BODY MASS INDEX: 53.92 KG/M2 | HEIGHT: 62 IN | WEIGHT: 293 LBS | BODY MASS INDEX: 53.92 KG/M2 | HEIGHT: 62 IN

## 2017-12-14 DIAGNOSIS — G57.02: Primary | ICD-10-CM

## 2017-12-14 DIAGNOSIS — E66.01: ICD-10-CM

## 2017-12-14 PROCEDURE — 99283 EMERGENCY DEPT VISIT LOW MDM: CPT

## 2017-12-14 NOTE — ERD
ER Documentation


Chief Complaint


Chief Complaint


" sciatic nerve pain" x 2 days





HPI


42-year-old female with history of sciatica pain on her right presents the ED 

today complaining of "sciatic pain" on her left.  Patient stated that she had 

pain in her left buttock that radiates down to her left leg for the last 2 

days.  The pain is worse with movement, she cannot find a comfortable position.

  She is also complaining of the toes in her left foot feeling numb.  Denies 

falls or injuries.  Denies saddle paresthesia.  Denies bowel or bladder 

dysfunction.





ROS


All systems reviewed and are negative except as per history of present illness.





Medications


Home Meds


Active Scripts


Cyclobenzaprine Hcl* (Cyclobenzaprine Hcl*) 10 Mg Tablet, 10 MG PO TID, #15 TAB


   Prov:SUZANNE RAYA NP         12/14/17


Ibuprofen* (Motrin*) 800 Mg Tab, 800 MG PO Q6H Y for PAIN AND OR ELEVATED TEMP, 

#30 TAB


   Prov:SUZANNE RAYA NP         12/14/17


Tramadol HCl (Tramadol HCl) 50 Mg Tablet, 50 MG PO Q4 Y for PAIN, #15 TAB


   Prov:GURMEET KOEHLER PA-C         9/7/17


Ondansetron (Ondansetron Odt) 4 Mg Tab.rapdis, 4 MG PO Q6H Y for NAUSEA AND/OR 

VOMITING, #10 TAB


   Prov:GURMEET KOEHLER PA-C         9/7/17


Tramadol HCl (Tramadol HCl) 50 Mg Tablet, 50 MG PO Q6, #20 TAB


   Prov:DOUGLAS JONES         8/20/17


Albuterol Sulfate* (Proair HFA*) 8.5 Gm Hfa.aer.ad, 2 PUFF INH Q4, #1 INHALER


   Prov:DOUGLAS OJNES         8/20/17


Ibuprofen* (Motrin*) 800 Mg Tab, 800 MG PO Q8 Y for PAIN AND OR ELEVATED TEMP, #

30 TAB


   Prov:DOUGLAS JONES         8/20/17


Cyclobenzaprine Hcl* (Cyclobenzaprine Hcl*) 10 Mg Tablet, 10 MG PO Q12, #20 TAB


   Prov:DOUGLAS JONES         8/20/17


Hydrocodone/Acetaminophen (Norco 5-325 Tablet) 1 Each Tablet, 1 TAB PO Q6H Y 

for SEVERE PAIN LEVEL 7-10, #20 TAB


   Prov:YOSELINCASSINARCISA MCMILLAN NP         7/31/17


Ibuprofen* (Motrin*) 600 Mg Tab, 600 MG PO Q6H Y for PAIN AND OR ELEVATED TEMP, 

#30 TAB


   Prov:YOSELINCASSINARCISA MCMILLAN. NP         7/31/17


Sulfamethoxazole-Trimethoprim* (Bactrim* DS) 800-160 Mg Tab, 1 TAB PO BID for 5 

Days, TAB


   Prov:GIFTY,ZOHAIB DO         1/4/17


Naproxen* (Naprosyn*) 500 Mg Tablet, 500 MG PO BID Y for PAIN AND/OR 

INFLAMMATION, #20 TAB


   Prov:GIFTYDAJAZOHAIB DO         1/4/17


Cyclobenzaprine Hcl* (Cyclobenzaprine Hcl*) 5 Mg Tablet, 5 MG PO Q8H Y for PAIN

, #15 TAB


   Prov:GURMEET KOEHLER PA-C         12/14/16


Prednisone* (Prednisone*) 20 Mg Tab, 40 MG PO DAILY for 4 Days, TAB


   Prov:GURMEET KOEHLER PA-C         12/14/16


Ibuprofen* (Motrin*) 600 Mg Tab, 600 MG PO Q6, #30 TAB


   Prov:GURMEET KOEHLER PA-C         12/14/16





Allergies


Allergies:  


Coded Allergies:  


     No Known Allergy (Unverified , 10/22/17)





PMhx/Soc


History of Surgery:  Yes (c-sec, maikol)


Anesthesia Reaction:  No


Hx Neurological Disorder:  No


Hx Respiratory Disorders:  No


Hx Cardiac Disorders:  No


Hx Psychiatric Problems:  No


Hx Miscellaneous Medical Probl:  Yes (carpal tunnel)


Hx Alcohol Use:  No


Hx Substance Use:  No


Hx Tobacco Use:  No


Smoking Status:  Never smoker





Physical Exam


Vitals





Vital Signs








  Date Time  Temp Pulse Resp B/P Pulse Ox O2 Delivery O2 Flow Rate FiO2


 


12/14/17 08:17 97.0 84 18 146/91 98   








Physical Exam


General:    Well-developed, morbidly obese, conscious and coherent, in no 

distress


Skin:    Warm and dry without rash, good texture and turgor


Head:    Normocephalic without evidence of trauma


Eyes:    Sclera and conjunctivae normal; pupils equal, round, and reactive to 

light; extraocular movements are intact


Chest:    Normal AP diameter.  Good expansion without retractions.  Nontender.  

Lungs are clear to auscultate bilaterally with good tidal volume


Heart:    Regular rate and rhythm.  No murmur, rub, or gallops heard


Back:    Without spinal or CVA tenderness.  Left gluteal tenderness.


Extremities:    Full range of motion.  Good strength bilaterally.  No clubbing, 

cyanosis, or edema. Peripheral pulses are intact. Sensation intact


Neuro:    Alert and oriented 4, GCS 15.  Cranial nerves grossly intact.  Motor 

exams nonfocal.  Diminished sensation of her left big toe, neuro exam otherwise 

normal.  Moves all extremities.  Speech clear.  Gait normal


Results 24 hrs





 Current Medications








 Medications


  (Trade)  Dose


 Ordered  Sig/Dacia


 Route


 PRN Reason  Start Time


 Stop Time Status Last Admin


Dose Admin


 


 Bupivacaine HCl


  (Marcaine 0.25%


  (Mpf) 10 ml)  10 ml  ONCE  ONCE


 INJ


   12/14/17 09:00


 12/14/17 09:01 DC  


 


 


 Bupivacaine HCl


  (Marcaine 0.25%


  (Mpf) 30 ml)  VOLUME PER


 MD  ONCE


 INJ


   12/14/17 09:30


 12/14/17 09:31 DC  


 











Procedures/MDM


Morbidly obese 42-year-old female presented ED with sciatic nerve pain 

originates from her left gluteal region.  Patient does not have any midline 

spinal tenderness.  I doubt spinal fracture, subluxation, or disc herniation.  

I doubt spinal epidural abscess, cauda equina syndrome.





Procedure note: Trigger point injection


Trigger point injection performed by me. 10 mL of bupivacaine is injected into 

left gluteal region. Total number muscle groups injected: 1. Patient reports 

improvement of pain after the trigger point injection.





Patient appears well, stable for discharge and outpatient management.  Patient 

is advised to follow-up with her PCP and request for physical therapy referral.

  Medical decision making shared with patient and family. Education provided to 

patient and family. Patient and family expressed understanding of the plan.





Medications on discharge: Ibuprofen, Flexeril.


Follow-up: Primary care provider in 2-3 days or return to ED if worse.





Disclaimer: Inadvertent spelling and grammatical errors are likely due to EHR/

dictation software use and do not reflect on the overall quality of patient 

care. Also, please note that the electronic time recorded on this note does not 

necessarily reflect the actual time of the patient encounter.





Departure


Diagnosis:  


 Primary Impression:  


 Pyriformis syndrome


 Laterality:  left  Qualified Code:  G57.02 - Piriformis syndrome of left side


 Additional Impression:  


 Morbid obesity


Condition:  Stable


Patient Instructions:  Back Exercises: Hip Rotator Stretch, Back Exercises: 

Lower Back Stretch, Back Spasm, No Trauma





Additional Instructions:  


Call your primary care doctor TOMORROW for an appointment during the next 2-3 

days.See the doctor sooner or return here if your condition worsens before your 

appointment time.





Ask your primary provider for a referral to physical therapy.











SUZANNE RAYA NP Dec 14, 2017 08:48

## 2018-02-23 ENCOUNTER — HOSPITAL ENCOUNTER (EMERGENCY)
Dept: HOSPITAL 91 - FTE | Age: 43
Discharge: HOME | End: 2018-02-23
Payer: COMMERCIAL

## 2018-02-23 ENCOUNTER — HOSPITAL ENCOUNTER (EMERGENCY)
Age: 43
Discharge: HOME | End: 2018-02-23

## 2018-02-23 DIAGNOSIS — M25.561: Primary | ICD-10-CM

## 2018-02-23 PROCEDURE — 96372 THER/PROPH/DIAG INJ SC/IM: CPT

## 2018-02-23 PROCEDURE — 99284 EMERGENCY DEPT VISIT MOD MDM: CPT

## 2018-02-23 RX ADMIN — KETOROLAC TROMETHAMINE 1 MG: 30 INJECTION, SOLUTION INTRAMUSCULAR at 12:13

## 2018-03-12 ENCOUNTER — HOSPITAL ENCOUNTER (EMERGENCY)
Age: 43
Discharge: HOME | End: 2018-03-12

## 2023-01-15 ENCOUNTER — HOSPITAL ENCOUNTER (EMERGENCY)
Dept: HOSPITAL 54 - ER | Age: 48
Discharge: LEFT BEFORE DISCHARGE | End: 2023-01-15
Payer: MEDICARE

## 2023-01-15 VITALS — HEIGHT: 68 IN | WEIGHT: 293 LBS | BODY MASS INDEX: 44.41 KG/M2

## 2023-01-15 VITALS — SYSTOLIC BLOOD PRESSURE: 132 MMHG | DIASTOLIC BLOOD PRESSURE: 77 MMHG

## 2023-01-15 DIAGNOSIS — R07.89: Primary | ICD-10-CM

## 2023-01-15 LAB
BASOPHILS # BLD AUTO: 0 K/UL (ref 0–0.2)
BASOPHILS NFR BLD AUTO: 0.4 % (ref 0–2)
BUN SERPL-MCNC: 12 MG/DL (ref 7–18)
CALCIUM SERPL-MCNC: 9.1 MG/DL (ref 8.5–10.1)
CHLORIDE SERPL-SCNC: 102 MMOL/L (ref 98–107)
CO2 SERPL-SCNC: 24 MMOL/L (ref 21–32)
CREAT SERPL-MCNC: 0.8 MG/DL (ref 0.6–1.3)
EOSINOPHIL NFR BLD AUTO: 2.9 % (ref 0–6)
EOSINOPHIL NFR BLD MANUAL: 1 % (ref 0–4)
GLUCOSE SERPL-MCNC: 96 MG/DL (ref 74–106)
HCT VFR BLD AUTO: 36 % (ref 33–45)
HGB BLD-MCNC: 11 G/DL (ref 11.5–14.8)
LYMPHOCYTES NFR BLD AUTO: 2.1 K/UL (ref 0.8–4.8)
LYMPHOCYTES NFR BLD AUTO: 33.3 % (ref 20–44)
LYMPHOCYTES NFR BLD MANUAL: 29 % (ref 16–48)
MCHC RBC AUTO-ENTMCNC: 31 G/DL (ref 31–36)
MCV RBC AUTO: 68 FL (ref 82–100)
MONOCYTES NFR BLD AUTO: 0.4 K/UL (ref 0.1–1.3)
MONOCYTES NFR BLD AUTO: 6 % (ref 2–12)
MONOCYTES NFR BLD MANUAL: 7 % (ref 0–11)
NEUTROPHILS # BLD AUTO: 3.6 K/UL (ref 1.8–8.9)
NEUTROPHILS NFR BLD AUTO: 57.4 % (ref 43–81)
NEUTS SEG NFR BLD MANUAL: 63 % (ref 42–76)
PLATELET # BLD AUTO: 244 K/UL (ref 150–450)
POTASSIUM SERPL-SCNC: 3.9 MMOL/L (ref 3.5–5.1)
RBC # BLD AUTO: 5.3 MIL/UL (ref 4–5.2)
SODIUM SERPL-SCNC: 138 MMOL/L (ref 136–145)
WBC NRBC COR # BLD AUTO: 6.4 K/UL (ref 4.3–11)